# Patient Record
Sex: FEMALE | Race: WHITE | NOT HISPANIC OR LATINO | Employment: FULL TIME | URBAN - METROPOLITAN AREA
[De-identification: names, ages, dates, MRNs, and addresses within clinical notes are randomized per-mention and may not be internally consistent; named-entity substitution may affect disease eponyms.]

---

## 2023-09-20 PROBLEM — G47.33 OBSTRUCTIVE SLEEP APNEA SYNDROME: Status: ACTIVE | Noted: 2023-09-20

## 2023-09-20 PROBLEM — E66.9 OBESITY WITH BODY MASS INDEX 30 OR GREATER: Status: ACTIVE | Noted: 2023-09-20

## 2023-09-20 PROBLEM — K44.9 DIAPHRAGMATIC HERNIA WITHOUT OBSTRUCTION OR GANGRENE: Status: ACTIVE | Noted: 2023-09-20

## 2023-09-20 PROBLEM — N18.30 STAGE 3 CHRONIC KIDNEY DISEASE (MULTI): Status: ACTIVE | Noted: 2023-09-20

## 2023-09-20 PROBLEM — K21.9 GASTROESOPHAGEAL REFLUX DISEASE WITH HIATAL HERNIA: Status: ACTIVE | Noted: 2023-09-20

## 2023-09-20 PROBLEM — K30 DELAYED GASTRIC EMPTYING: Status: ACTIVE | Noted: 2023-09-20

## 2023-09-20 PROBLEM — I10 BENIGN ESSENTIAL HYPERTENSION: Status: ACTIVE | Noted: 2023-09-20

## 2023-09-20 PROBLEM — K44.9 GASTROESOPHAGEAL REFLUX DISEASE WITH HIATAL HERNIA: Status: ACTIVE | Noted: 2023-09-20

## 2023-09-20 RX ORDER — FAMOTIDINE 40 MG/1
TABLET, FILM COATED ORAL
COMMUNITY
End: 2024-04-11 | Stop reason: WASHOUT

## 2023-09-20 RX ORDER — METOPROLOL TARTRATE 50 MG/1
TABLET ORAL
COMMUNITY

## 2023-09-20 RX ORDER — ALLOPURINOL 100 MG/1
TABLET ORAL
COMMUNITY

## 2023-09-20 RX ORDER — BUPROPION HYDROCHLORIDE 300 MG/1
TABLET ORAL
COMMUNITY

## 2023-09-20 RX ORDER — TROSPIUM CHLORIDE 20 MG/1
TABLET, FILM COATED ORAL
COMMUNITY
End: 2024-04-11 | Stop reason: WASHOUT

## 2023-09-20 RX ORDER — BUTYROSPERMUM PARKII(SHEA BUTTER), SIMMONDSIA CHINENSIS (JOJOBA) SEED OIL, ALOE BARBADENSIS LEAF EXTRACT .01; 1; 3.5 G/100G; G/100G; G/100G
LIQUID TOPICAL
COMMUNITY
End: 2024-04-11 | Stop reason: WASHOUT

## 2023-09-20 RX ORDER — POTASSIUM CHLORIDE 1.5 G/1.58G
POWDER, FOR SOLUTION ORAL
COMMUNITY

## 2023-09-20 RX ORDER — OMEPRAZOLE 40 MG/1
CAPSULE, DELAYED RELEASE ORAL
COMMUNITY
End: 2024-04-11 | Stop reason: WASHOUT

## 2023-09-20 RX ORDER — HYDROCHLOROTHIAZIDE 25 MG/1
TABLET ORAL
COMMUNITY
End: 2024-04-11 | Stop reason: WASHOUT

## 2024-01-02 ENCOUNTER — APPOINTMENT (OUTPATIENT)
Dept: SURGERY | Facility: CLINIC | Age: 64
End: 2024-01-02
Payer: COMMERCIAL

## 2024-01-04 ENCOUNTER — APPOINTMENT (OUTPATIENT)
Dept: SURGERY | Facility: CLINIC | Age: 64
End: 2024-01-04
Payer: COMMERCIAL

## 2024-03-11 NOTE — PROGRESS NOTES
Patient ID: Morenita Lozada is a 63 y.o. female.  Primary Care Provider: Kevin Gorman DO    Subjective    Ref Provider: ABBY Mcbride MD     HPI: 61yo  with history of recurrent HPV presenting in GYN Oncology consultation for evaluation re: surgical resection. Patient reports history of HPV-associated cervical dysplasia for past 5-6 years with prior LEEP excision x2. Most recent evaluation with ASCUS Pap +HRHPV s/p LEEP 1 year ago, scant sampling on ECC. Patient has no acute complaints today. Denies abdominopelvic pain, nausea/vomiting, fevers, chills, chest pain or shortness of breath. She denies vaginal bleeding or abnormal vaginal discharge. Denies changes to appetite, bowel or bladder habits. Denies recent weight changes.     Treatment History:  3/2022 - LEEP: Transformation zone mucosa with chronic cervicitis and squamous epithelial denudation, see comment. Atrophy. Nabothian cyst -- Comment: no evidence of dysplasia or malignancy in tissue submitted. Clinical correlation necessary.   2022 - Pap: ASCUS +HPV 16, other HRHPV  2023 - ECC: scant fragments of squamous epithelium with focal mild squamous atypia; HPV effect cannot be excluded. Comment: Immunostain for p16 is essentially negative and the immunostain for Ki-67 demonstrate focal increased immunoreactive cells. Focal HPV cytopathic effect cannot be excluded.     Recent Imagin23 - CT A/P  Impression:  No acute findings in abdomen or pelvis. 1.5cm sclerotic lesion left ilium is nonspecific. Given its shape I would suggest follow-up nonemergent bone scan be performed. Right renal cyst. Moderate-sized hiatal hernia.   ============================================  Medical History:  - HTN  - Chronic renal insufficiency   - HLD  - GERD  - Recurrent UTIs    Surgical History:  (pending hiatal hernia repair)   - Supracervical hysterectomy ()  - Cholecystectomy  - Urethral sling    Obstetric/Gynecologic History: , FTSVD x3  -  Menarche age 13  - History of OCP or HRT use: denies  - Last Pap:  Dec 2022 (ASCUS, p16+); prior LEEP x2 with abnormal Pap x5 years    Family History:   - Denies family history of breast/colon/ovarian/uterine cancers  - Aunt  2/2 pancreatic cancer    Social History: ; works in [employer]  - Tobacco: denies  - Alcohol: 5-6 days per week, 1-2 drinks at a time  - Illicit substances, narcotics: denies    Health Maintenance:   - Last mammogram:        Objective    Visit Vitals  /86 (BP Location: Left arm, Patient Position: Sitting, BP Cuff Size: Adult)   Pulse 76   Resp 16        Interval history:  Patient is a 62 yo with history ASCUS Pap, +HRHPV with scant atypia on ECC sent to us for further management.  Last pap 2023 was LGSIL, HPV other +.  ECC=CIN1.  Here for 6 month follow up.   Patient is due for mammogram. She is not currently sexually active. Reports that she has a hx of monthly UTIs, but since starting OTC [place], has not had another.She denies any vaginal bleeding or discharge that appears to be mixed with blood, dysuria, vaginal tissue dryness or irritation, swelling or pain in LE, unexpected changes in weight, fatigue, diarrhea, bloating, pressure, or pelvic pain.   Physical Exam:    Constitutional: Doing well. SILAS  Eyes: PERRL  ENMT: Moist mucus membranes  Head/Neck: Supple. Symmetrical  Cardiovascular: Regular, rate and rhythm. 2+ equal pulses of the extremities  Respiratory/Thorax: CTA. RRR. Chest rise symmetrical.  Gastrointestinal: Non-distended, soft, non-tender  Genitourinary: External genitalia normal. No vulvar lesions visualized.  Cervical os visualized with no lesions, stenotic os, cervical borders obliterated, LEEP changes noted, atrophic changes to tissue,  no CMT, small mobile uterus, no adnexal masses noted. Smooth vaginal walls.  Vulva with no lesions noted. Smooth rectovaginal septum.   Musculoskeletal: ROM intact, no joint swelling, normal  strength  Extremities: No edema  Neurological: Alert and oriented x 3. Pleasant and cooperative.  Lymphatic: No lymphadenopathy. No lymphedema  Psychological: Appropriate mood and behavior  Skin: Warm and dry, no lesions, no rashes    A complete review of systems was performed and all systems were normal except what is noted in the interval history.          Assessment/Plan Patient is a 64 yo with history ASCUS Pap, +HRHPV with scant atypia on ECC for further management.  Last pap 9/2023 was LGSIL, HPV other +.  ECC=CIN1.  No new signs of disease since then. Vaginal atrophy.      PLAN:    Mammogram order given  Pap performed in office, F/U results  If pap stable, F/U in 6 months and repeat pap in 1 year.   RX for Estrace sent, intravaginally three nights a week, using dime to nickel size amount on finger and applying at specified area.  Do not use applicator.          SASKIA Mason     I was present with the PA student who participated in the documentation of this note.  I have personally seen and re-examined the patient and performed the medical decision-making components (assessment and plan of care). I have reviewed the PA student documentation and verified the findings in the note as written with additions or exceptions as stated in the body of this note.

## 2024-03-14 ENCOUNTER — OFFICE VISIT (OUTPATIENT)
Dept: GYNECOLOGIC ONCOLOGY | Facility: CLINIC | Age: 64
End: 2024-03-14
Payer: COMMERCIAL

## 2024-03-14 VITALS
RESPIRATION RATE: 16 BRPM | DIASTOLIC BLOOD PRESSURE: 86 MMHG | BODY MASS INDEX: 29.7 KG/M2 | SYSTOLIC BLOOD PRESSURE: 134 MMHG | WEIGHT: 173.94 LBS | HEIGHT: 64 IN | HEART RATE: 76 BPM

## 2024-03-14 DIAGNOSIS — Z12.31 SCREENING MAMMOGRAM, ENCOUNTER FOR: Primary | ICD-10-CM

## 2024-03-14 DIAGNOSIS — N87.9 CERVICAL DYSPLASIA: ICD-10-CM

## 2024-03-14 DIAGNOSIS — N95.2 VAGINAL ATROPHY: ICD-10-CM

## 2024-03-14 PROCEDURE — 88141 CYTOPATH C/V INTERPRET: CPT | Performed by: PATHOLOGY

## 2024-03-14 PROCEDURE — 99214 OFFICE O/P EST MOD 30 MIN: CPT | Performed by: NURSE PRACTITIONER

## 2024-03-14 PROCEDURE — 3075F SYST BP GE 130 - 139MM HG: CPT | Performed by: NURSE PRACTITIONER

## 2024-03-14 PROCEDURE — 88175 CYTOPATH C/V AUTO FLUID REDO: CPT

## 2024-03-14 PROCEDURE — 3079F DIAST BP 80-89 MM HG: CPT | Performed by: NURSE PRACTITIONER

## 2024-03-14 PROCEDURE — 87624 HPV HI-RISK TYP POOLED RSLT: CPT

## 2024-03-14 PROCEDURE — 1036F TOBACCO NON-USER: CPT | Performed by: NURSE PRACTITIONER

## 2024-03-14 RX ORDER — ESTRADIOL 0.1 MG/G
CREAM VAGINAL
Qty: 42.5 G | Refills: 3 | Status: SHIPPED | OUTPATIENT
Start: 2024-03-14

## 2024-03-14 ASSESSMENT — PAIN SCALES - GENERAL: PAINLEVEL: 0-NO PAIN

## 2024-03-14 NOTE — PROGRESS NOTES
Patient ID: Morenita Lozada is a 63 y.o. female.  Primary Care Provider: Kevin Gorman DO    Subjective    Ref Provider: ABBY Mcbride MD     HPI: 63yo  with history of recurrent HPV presenting in GYN Oncology consultation for evaluation re: surgical resection. Patient reports history of HPV-associated cervical dysplasia for past 5-6 years with prior LEEP excision x2. Most recent evaluation with ASCUS Pap +HRHPV s/p LEEP 1 year ago, scant sampling on ECC. Patient has no acute complaints today. Denies abdominopelvic pain, nausea/vomiting, fevers, chills, chest pain or shortness of breath. She denies vaginal bleeding or abnormal vaginal discharge. Denies changes to appetite, bowel or bladder habits. Denies recent weight changes.     Treatment History:  3/2022 - LEEP: Transformation zone mucosa with chronic cervicitis and squamous epithelial denudation, see comment. Atrophy. Nabothian cyst -- Comment: no evidence of dysplasia or malignancy in tissue submitted. Clinical correlation necessary.   2022 - Pap: ASCUS +HPV 16, other HRHPV  2023 - ECC: scant fragments of squamous epithelium with focal mild squamous atypia; HPV effect cannot be excluded. Comment: Immunostain for p16 is essentially negative and the immunostain for Ki-67 demonstrate focal increased immunoreactive cells. Focal HPV cytopathic effect cannot be excluded.     Recent Imagin23 - CT A/P  Impression:  No acute findings in abdomen or pelvis. 1.5cm sclerotic lesion left ilium is nonspecific. Given its shape I would suggest follow-up nonemergent bone scan be performed. Right renal cyst. Moderate-sized hiatal hernia.   ============================================  Medical History:  - HTN  - Chronic renal insufficiency   - HLD  - GERD  - Recurrent UTIs    Surgical History:  (pending hiatal hernia repair)   - Supracervical hysterectomy ()  - Cholecystectomy  - Urethral sling    Obstetric/Gynecologic History: , FTSVD x3  -  Menarche age 13  - History of OCP or HRT use: denies  - Last Pap:  Dec 2022 (ASCUS, p16+); prior LEEP x2 with abnormal Pap x5 years    Family History:   - Denies family history of breast/colon/ovarian/uterine cancers  - Aunt  2/2 pancreatic cancer    Social History: ; works in [employer]  - Tobacco: denies  - Alcohol: 5-6 days per week, 1-2 drinks at a time  - Illicit substances, narcotics: denies    Health Maintenance:   - Last mammogram:        Objective    Visit Vitals  /86 (BP Location: Left arm, Patient Position: Sitting, BP Cuff Size: Adult)   Pulse 76   Resp 16        Interval history:  Patient is a 64 yo with history ASCUS Pap, +HRHPV with scant atypia on ECC for further management.  Last pap 2023 was LGSIL, HPV other +.  ECC=CIN1.  Here for 6 month follow up.  Patient is due for mammogram. She is not currently sexually active. Reports that she has a hx of monthly UTIs, but since starting OTC [place], has not had another. We discussed the importance of continuing her surveillance every 6 months, for as long as her pathology report shows changes in grade or reveals +HPV16/18. She denies any vaginal bleeding or discharge that appears to be mixed with blood, dysuria, vaginal tissue dryness or irritation, swelling or pain in LE, unexpected changes in weight, fatigue, diarrhea, bloating, pressure, or pelvic pain.       Physical Exam:    Constitutional: Doing well. SILAS  Eyes: PERRL  ENMT: Moist mucus membranes  Head/Neck: Supple. Symmetrical  Cardiovascular: Regular, rate and rhythm. 2+ equal pulses of the extremities  Respiratory/Thorax: CTA. RRR. Chest rise symmetrical.  Gastrointestinal: Non-distended, soft, non-tender  Genitourinary:   External genitalia normal. No vulvar lesions visualized.  Speculum exam: Smooth vaginal walls with atrophy and without lesions or masses. Cervix is shortened, scarred, and stenotic.   Bimanual exam: Smooth vaginal wall without lesions or  masses. Normal palpation of uterus, cervix, and adnexa.    Rectovaginal exam: smooth rectovaginal septum without lesions or masses  Musculoskeletal: ROM intact, no joint swelling, normal strength  Extremities: No edema  Neurological: Alert and oriented x 3. Pleasant and cooperative.  Lymphatic: No lymphadenopathy. No lymphedema  Psychological: Appropriate mood and behavior  Skin: Warm and dry, no lesions, no rashes    A complete review of systems was performed and all systems were normal except what is noted in the interval history.          Assessment/Plan   Patient is a 64 yo with history ASCUS Pap, +HRHPV with scant atypia on ECC for further management.  Last pap 9/2023 was LGSIL, HPV other +.  ECC=CIN1. No new signs of disease since then.     PLAN:    Order for mammogram given today.  Pap performed in office.  Will follow up in 6 months if pap results show change in grade or +HPV 16/18      SASKIA Mason    I was present with the PA student who participated in the documentation of this note.  I have personally seen and re-examined the patient and performed the medical decision-making components (assessment and plan of care). I have reviewed the PA student documentation and verified the findings in the note as written with additions or exceptions as stated in the body of this note.

## 2024-03-19 ENCOUNTER — HOSPITAL ENCOUNTER (OUTPATIENT)
Dept: RADIOLOGY | Facility: HOSPITAL | Age: 64
Discharge: HOME | End: 2024-03-19
Payer: COMMERCIAL

## 2024-03-19 VITALS — BODY MASS INDEX: 28.17 KG/M2 | HEIGHT: 64 IN | WEIGHT: 165 LBS

## 2024-03-19 DIAGNOSIS — Z12.31 SCREENING MAMMOGRAM, ENCOUNTER FOR: ICD-10-CM

## 2024-03-19 PROCEDURE — 77063 BREAST TOMOSYNTHESIS BI: CPT | Performed by: RADIOLOGY

## 2024-03-19 PROCEDURE — 77067 SCR MAMMO BI INCL CAD: CPT | Performed by: RADIOLOGY

## 2024-03-19 PROCEDURE — 77067 SCR MAMMO BI INCL CAD: CPT

## 2024-03-22 ENCOUNTER — TELEPHONE (OUTPATIENT)
Dept: GYNECOLOGIC ONCOLOGY | Facility: HOSPITAL | Age: 64
End: 2024-03-22
Payer: COMMERCIAL

## 2024-03-22 LAB
CYTOLOGY CMNT CVX/VAG CYTO-IMP: NORMAL
HPV HR 12 DNA GENITAL QL NAA+PROBE: POSITIVE
HPV HR GENOTYPES PNL CVX NAA+PROBE: POSITIVE
HPV16 DNA SPEC QL NAA+PROBE: POSITIVE
HPV18 DNA SPEC QL NAA+PROBE: NEGATIVE
LAB AP HPV GENOTYPE QUESTION: YES
LAB AP HPV HR: NORMAL
LAB AP PREVIOUS ABNORMAL HISTORY: NORMAL
LABORATORY COMMENT REPORT: NORMAL
PATH REPORT.TOTAL CANCER: NORMAL

## 2024-03-22 NOTE — TELEPHONE ENCOUNTER
Phoned patient to notify her that pap results showed LGSIL/+ HPV and Epifanio Landeros CNP recommends return office visit for colposcopy.   Message sent to Gabrielle Rivera requesting she scheduled patient for colposcopy appointment.

## 2024-04-10 NOTE — PROGRESS NOTES
Patient ID: Morenita Lozada is a 63 y.o. female.  Primary Care Provider: Kevin Gorman DO    Subjective    Ref Provider: ABBY Mcbride MD     HPI: 63yo  with history of recurrent HPV presenting in GYN Oncology consultation for evaluation re: surgical resection. Patient reports history of HPV-associated cervical dysplasia for past 5-6 years with prior LEEP excision x2. Most recent evaluation with ASCUS Pap +HRHPV s/p LEEP 1 year ago, scant sampling on ECC. Patient has no acute complaints today. Denies abdominopelvic pain, nausea/vomiting, fevers, chills, chest pain or shortness of breath. She denies vaginal bleeding or abnormal vaginal discharge. Denies changes to appetite, bowel or bladder habits. Denies recent weight changes.     Treatment History:  3/2022 - LEEP: Transformation zone mucosa with chronic cervicitis and squamous epithelial denudation, see comment. Atrophy. Nabothian cyst -- Comment: no evidence of dysplasia or malignancy in tissue submitted. Clinical correlation necessary.   2022 - Pap: ASCUS +HPV 16, other HRHPV  2023 - ECC: scant fragments of squamous epithelium with focal mild squamous atypia; HPV effect cannot be excluded. Comment: Immunostain for p16 is essentially negative and the immunostain for Ki-67 demonstrate focal increased immunoreactive cells. Focal HPV cytopathic effect cannot be excluded.     Recent Imagin23 - CT A/P  Impression:  No acute findings in abdomen or pelvis. 1.5cm sclerotic lesion left ilium is nonspecific. Given its shape I would suggest follow-up nonemergent bone scan be performed. Right renal cyst. Moderate-sized hiatal hernia.   ============================================  Medical History:  - HTN  - Chronic renal insufficiency   - HLD  - GERD  - Recurrent UTIs    Surgical History:  (pending hiatal hernia repair)   - Supracervical hysterectomy ()  - Cholecystectomy  - Urethral sling    Obstetric/Gynecologic History: , FTSVD x3  -  Menarche age 13  - History of OCP or HRT use: denies  - Last Pap:  Dec 2022 (ASCUS, p16+); prior LEEP x2 with abnormal Pap x5 years    Family History:   - Denies family history of breast/colon/ovarian/uterine cancers  - Aunt  2/2 pancreatic cancer    Social History: ; works in [place]  - Tobacco: denies  - Alcohol: 5-6 days per week, 1-2 drinks at a time  - Illicit substances, narcotics: denies    Health Maintenance:   - Last mammogram:        Objective    Visit Vitals  /75 (BP Location: Right arm, Patient Position: Sitting, BP Cuff Size: Adult long)   Pulse 64   Resp 18          Interval history:  Patient is a 62 yo with history ASCUS Pap, +HRHPV with scant atypia on ECC sent to us for further management.  Last pap 3/2024 was LGSIL, HPV 16 and other+.  Here for colposcopy.     Patient states she started using Estrace cream since last visit.  She denies any new complaints since last seen.     Physical Exam:    Constitutional: Doing well. SILAS  Eyes: PERRL  ENMT: Moist mucus membranes  Head/Neck: Supple. Symmetrical  Genitourinary: Colposcopy: Adequate colposcopy performed after application of dilute acetic acid solution to the cervix.  There were some acetowhite changes and punctate vessels noted at 9 o'clock.  1 cervical biopsy taken at 9 o'clock and an ECC was also obtained.  Monsel's and pressure were used for hemostasis.  The patient tolerated the procedure well.  Musculoskeletal: ROM intact, no joint swelling, normal strength  Extremities: No edema  Neurological: Alert and oriented x 3. Pleasant and cooperative.  Psychological: Appropriate mood and behavior  Skin: Warm and dry, no lesions, no rashes    A complete review of systems was performed and all systems were normal except what is noted in the interval history.        Assessment/Plan Patient is a 62 yo with history ASCUS Pap, +HRHPV with scant atypia on ECC s/p LEEP.   LGSIL HPV+ pap.  +acetowhite changes.       PLAN:  ECC done today, F/U results  Cervical biopsy, F/U results  If DIANA 1, repeat pap in 1 year and exam in 6 months  If DIANA 2-3 refer for excision procedure

## 2024-04-11 ENCOUNTER — OFFICE VISIT (OUTPATIENT)
Dept: GYNECOLOGIC ONCOLOGY | Facility: CLINIC | Age: 64
End: 2024-04-11
Payer: COMMERCIAL

## 2024-04-11 VITALS
SYSTOLIC BLOOD PRESSURE: 138 MMHG | BODY MASS INDEX: 28.82 KG/M2 | OXYGEN SATURATION: 98 % | HEART RATE: 64 BPM | HEIGHT: 64 IN | RESPIRATION RATE: 18 BRPM | DIASTOLIC BLOOD PRESSURE: 75 MMHG | WEIGHT: 168.8 LBS

## 2024-04-11 DIAGNOSIS — R87.612 LOW GRADE SQUAMOUS INTRAEPITHELIAL LESION ON CYTOLOGIC SMEAR OF CERVIX (LGSIL): ICD-10-CM

## 2024-04-11 DIAGNOSIS — N95.2 VAGINAL ATROPHY: ICD-10-CM

## 2024-04-11 DIAGNOSIS — N87.9 CERVICAL DYSPLASIA: Primary | ICD-10-CM

## 2024-04-11 PROCEDURE — 3075F SYST BP GE 130 - 139MM HG: CPT | Performed by: NURSE PRACTITIONER

## 2024-04-11 PROCEDURE — 3078F DIAST BP <80 MM HG: CPT | Performed by: NURSE PRACTITIONER

## 2024-04-11 PROCEDURE — 88305 TISSUE EXAM BY PATHOLOGIST: CPT

## 2024-04-11 PROCEDURE — 88305 TISSUE EXAM BY PATHOLOGIST: CPT | Performed by: PATHOLOGY

## 2024-04-11 PROCEDURE — 57454 BX/CURETT OF CERVIX W/SCOPE: CPT | Performed by: NURSE PRACTITIONER

## 2024-04-11 PROCEDURE — 88342 IMHCHEM/IMCYTCHM 1ST ANTB: CPT

## 2024-04-11 PROCEDURE — 88342 IMHCHEM/IMCYTCHM 1ST ANTB: CPT | Performed by: PATHOLOGY

## 2024-04-11 ASSESSMENT — PAIN SCALES - GENERAL: PAINLEVEL: 0-NO PAIN

## 2024-04-17 ENCOUNTER — TELEPHONE (OUTPATIENT)
Dept: GYNECOLOGIC ONCOLOGY | Facility: HOSPITAL | Age: 64
End: 2024-04-17
Payer: COMMERCIAL

## 2024-04-17 LAB
LAB AP ASR DISCLAIMER: NORMAL
LABORATORY COMMENT REPORT: NORMAL
PATH REPORT.COMMENTS IMP SPEC: NORMAL
PATH REPORT.FINAL DX SPEC: NORMAL
PATH REPORT.GROSS SPEC: NORMAL
PATH REPORT.RELEVANT HX SPEC: NORMAL
PATH REPORT.TOTAL CANCER: NORMAL

## 2024-04-17 NOTE — TELEPHONE ENCOUNTER
Phoned patient to notify that ECC and biopsy results showed CIN1 and that Akanksha Landeros CNP recommends repeat pap in 1 year and follow up as scheduled in September.   Message left to notify regarding stable results and follow up recommendations.

## 2024-05-16 ENCOUNTER — TELEPHONE (OUTPATIENT)
Dept: SURGERY | Facility: CLINIC | Age: 64
End: 2024-05-16
Payer: COMMERCIAL

## 2024-06-05 NOTE — PROGRESS NOTES
Subjective   Patient ID: Morenita Lozada is a 63 y.o. female who presents for No chief complaint on file..  HPI  ABDOMINAL PAIN/ POSSIBLE UMBILICAL HERNIA    Review of Systems  CONSTITUTIONAL:          Chills No.  Fatigue No.  Fever No.         CARDIOLOGY:          Negative for dizziness, chest pain, palpitations, shortness of breath.         RESPIRATORY:          Sleep Apnea No.  Negative for chest congestion, cough, wheezing.         GASTROENTEROLOGY:          Food Intolerance No.  Abdominal pain  yes.  Acid reflux No.  Black stools No.  Constipation No.  Diarrhea No.  Loss of appetite no.  Nausea No.  Vomiting No.         UROLOGY:          Negative for dysuria, urinary urgency, kidney stones.         PSYCHOLOGY:          Negative for depression, anxiety, high stress level.

## 2024-06-06 ENCOUNTER — OFFICE VISIT (OUTPATIENT)
Dept: SURGERY | Facility: CLINIC | Age: 64
End: 2024-06-06
Payer: COMMERCIAL

## 2024-06-06 VITALS
HEIGHT: 64 IN | HEART RATE: 80 BPM | DIASTOLIC BLOOD PRESSURE: 88 MMHG | SYSTOLIC BLOOD PRESSURE: 140 MMHG | WEIGHT: 168 LBS | BODY MASS INDEX: 28.68 KG/M2

## 2024-06-06 DIAGNOSIS — K43.2 INCISIONAL HERNIA, WITHOUT OBSTRUCTION OR GANGRENE: Primary | ICD-10-CM

## 2024-06-06 PROCEDURE — 99214 OFFICE O/P EST MOD 30 MIN: CPT | Performed by: SURGERY

## 2024-06-06 PROCEDURE — 3077F SYST BP >= 140 MM HG: CPT | Performed by: SURGERY

## 2024-06-06 PROCEDURE — 3079F DIAST BP 80-89 MM HG: CPT | Performed by: SURGERY

## 2024-06-06 RX ORDER — POLYETHYLENE GLYCOL 3350 17 G/17G
17 POWDER, FOR SOLUTION ORAL DAILY
COMMUNITY

## 2024-06-06 RX ORDER — LOSARTAN POTASSIUM 25 MG/1
25 TABLET ORAL DAILY
COMMUNITY

## 2024-06-06 ASSESSMENT — COLUMBIA-SUICIDE SEVERITY RATING SCALE - C-SSRS
1. IN THE PAST MONTH, HAVE YOU WISHED YOU WERE DEAD OR WISHED YOU COULD GO TO SLEEP AND NOT WAKE UP?: NO
2. HAVE YOU ACTUALLY HAD ANY THOUGHTS OF KILLING YOURSELF?: NO
6. HAVE YOU EVER DONE ANYTHING, STARTED TO DO ANYTHING, OR PREPARED TO DO ANYTHING TO END YOUR LIFE?: NO

## 2024-06-06 ASSESSMENT — LIFESTYLE VARIABLES
AUDIT-C TOTAL SCORE: 4
HOW OFTEN DO YOU HAVE SIX OR MORE DRINKS ON ONE OCCASION: NEVER
HOW MANY STANDARD DRINKS CONTAINING ALCOHOL DO YOU HAVE ON A TYPICAL DAY: 3 OR 4
HOW OFTEN DO YOU HAVE A DRINK CONTAINING ALCOHOL: 2-3 TIMES A WEEK
SKIP TO QUESTIONS 9-10: 0

## 2024-06-06 ASSESSMENT — PATIENT HEALTH QUESTIONNAIRE - PHQ9
1. LITTLE INTEREST OR PLEASURE IN DOING THINGS: NOT AT ALL
2. FEELING DOWN, DEPRESSED OR HOPELESS: NOT AT ALL
SUM OF ALL RESPONSES TO PHQ9 QUESTIONS 1 AND 2: 0

## 2024-06-06 ASSESSMENT — PAIN SCALES - GENERAL: PAINLEVEL: 0-NO PAIN

## 2024-06-06 NOTE — H&P
History Of Present Illness  Morenita Lozada is a 63 y.o. female presenting with an abdominal bulge and pain at site of known incisional hernia.  She had initial consultation with me in February of 2023 for a hiatal hernia.  At time of consultation, I could feel a bulge at site of 12mm trocar site subxiphoid from a prior laparoscopic cholecystectomy and CT showed an incisional hernia at this site.  At laparoscopic hiatal hernia repair last summer, my operative note makes mention of not seeing a dimple in the peritoneum at site of bulge so the falciform was not taken down and this hernia was not repaired.  Morenita tells me she was constipated for several days recently and had painful bulge at this location prompting her to call the office.  She was a little confused by the concept of incisional hernia and hiatal hernia and was worried her hiatal hernia had recurred.  She denies reflux, GERD or dysphagia.     Past Medical History  Past Medical History:   Diagnosis Date    Abdominal hernia     Acid reflux     Anxiety     Depression     HPV (human papilloma virus) anogenital infection     HTN (hypertension)        Surgical History  Past Surgical History:   Procedure Laterality Date    HYSTERECTOMY      age 31    LAPAROSCOPY REPAIR HIATAL HERNIA  06/21/2023    laparoscopic hiatal hernia repair with mesh reinforcement of crural repair, toupet partial fundoplication ([facility])        Social History  She reports that she has never smoked. She has never used smokeless tobacco. She reports current alcohol use. She reports that she does not use drugs.    Family History  Family History   Problem Relation Name Age of Onset    Hypertension Mother      Lung cancer Father      Stroke Sister      Hypertension Sister      Prostate cancer Brother          Allergies  Patient has no known allergies.    Review of Systems   CONSTITUTIONAL:          Chills No.  Fatigue No.  Fever No.         CARDIOLOGY:          Negative for dizziness, chest  "pain, palpitations, shortness of breath.         RESPIRATORY:          Sleep Apnea No.  Negative for chest congestion, cough, wheezing.         GASTROENTEROLOGY:          Food Intolerance No.  Abdominal pain  yes.  Acid reflux No.  Black stools No.  Constipation No.  Diarrhea No.  Loss of appetite no.  Nausea No.  Vomiting No.         UROLOGY:          Negative for dysuria, urinary urgency, kidney stones.         PSYCHOLOGY:          Negative for depression, anxiety, high stress level.    Physical Exam       General Examination:         GENERAL APPEARANCE: alert and oriented x 3, Pleasant and cooperative, No Acute Distress.          HEENT: PERRLA.          HEART: regular rate and rhythm.          LUNGS: clear to auscultation bilaterally.          CHEST: normal shape and expansion.          ABDOMEN: painful bulge at site of 12mm laparoscopic cholecystectomy trocar site corresponding to her recent pain and the notes on my office visit from last February, no guarding, no CVA tenderness.          EXTREMITIES: no edema, no ulcerations.          SKIN: normal, no rash.          NEUROLOGIC EXAM: CN's II-XII grossly intact, gait normal.          BACK: no CVA tenderness.       Last Recorded Vitals  Blood pressure 140/88, pulse 80, height 1.626 m (5' 4\"), weight 76.2 kg (168 lb).         Assessment/Plan   Problem List Items Addressed This Visit             ICD-10-CM    Incisional hernia, without obstruction or gangrene - Primary K43.2       I explained the differences between incisional hernia and hiatal hernia.  I explained the operative findings from last year.  I explained the reason she was uncomfortable while constipated.  I offered her laparoscopic incisional hernia repair with mesh.  She will start taking miralax regularly and call if she has any symptoms or questions.     I reviewed my operative notes, CT scans and office notes from last year in [facility] for a total of 15 minutes.  21 minutes face to face time in addition.  " Documenatation 10 minutes.  Total time 46 minutes.    Tavares Knox MD

## 2024-07-10 ENCOUNTER — TELEPHONE (OUTPATIENT)
Dept: SURGERY | Facility: CLINIC | Age: 64
End: 2024-07-10
Payer: COMMERCIAL

## 2024-07-10 NOTE — TELEPHONE ENCOUNTER
I called patient to find out if she was ready to schedule incisional hernia repair. She does not want to proceed with surgery at this point unless the hernia worsens. I asked her to call us back if anything changed.

## 2024-09-11 NOTE — PROGRESS NOTES
Patient ID: Morenita Lozada is a 64 y.o. female.  Primary Care Provider: Kevin Gorman DO    Subjective    Ref Provider: ABBY Mcbride MD     HPI: [id number]  with history of recurrent HPV presenting in GYN Oncology consultation     Treatment History:  3/2022 - LEEP: Transformation zone mucosa with chronic cervicitis and squamous epithelial denudation, see comment. Atrophy. Nabothian cyst -- Comment: no evidence of dysplasia or malignancy in tissue submitted. Clinical correlation necessary.   2022 - Pap: ASCUS +HPV 16, other HRHPV  2023 - ECC: scant fragments of squamous epithelium with focal mild squamous atypia; HPV effect cannot be excluded. Comment: Immunostain for p16 is essentially negative and the immunostain for Ki-67 demonstrate focal increased immunoreactive cells. Focal HPV cytopathic effect cannot be excluded.     Recent Imagin23 - CT A/P  Impression:  No acute findings in abdomen or pelvis. 1.5cm sclerotic lesion left ilium is nonspecific. Given its shape I would suggest follow-up nonemergent bone scan be performed. Right renal cyst. Moderate-sized hiatal hernia.   ============================================  Medical History:  - HTN  - Chronic renal insufficiency   - HLD  - GERD  - Recurrent UTIs    Surgical History:  (pending hiatal hernia repair)   - Supracervical hysterectomy ()  - Cholecystectomy  - Urethral sling    Obstetric/Gynecologic History: , FTSVD x3  - Menarche age 13  - History of OCP or HRT use: denies  - Last Pap:  Dec 2022 (ASCUS, p16+); prior LEEP x2 with abnormal Pap x5 years    Family History:   - Denies family history of breast/colon/ovarian/uterine cancers  - Aunt  2/2 pancreatic cancer    Social History: ; works in [employer]  - Tobacco: denies  - Alcohol: 5-6 days per week, 1-2 drinks at a time  - Illicit substances, narcotics: denies    Health Maintenance:   - Last mammogram:        Objective    Visit Vitals  BP  126/87 (BP Location: Right arm, Patient Position: Sitting, BP Cuff Size: Adult)   Pulse 72   Resp 18         Interval history:  Patient is a 65 yo with history ASCUS Pap, +HRHPV with scant atypia on ECC sent to us for further management.  Last pap 3/2024 was LGSIL, HPV 16 and other+. 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  Here for 6 month exam. Patient retired at the beginning of the month.  She is traveling between grandchildren to visit.  She has not been sexually active.  Patient denies any vaginal bleeding, pink tinged discharge. Patient has constipation, takes Miralax as needed.  Appetite has been good.  Energy levels are baseline.  Patient denies hematuria.  Patient denies urinary leakage or incontinence, takes Vesicare.  Has had chronic UTI's, 4 in the last 6 months.   She has been following Urology, has abx at home and takes when she gets symptoms.  Has been using Estrace 3 times a week also.  Mammogram is up to date.          Physical Exam:    Constitutional: Doing well. SILAS  Eyes: PERRL  ENMT: Moist mucus membranes  Head/Neck: Supple. Symmetrical  Cardiovascular: Regular, rate and rhythm. 2+ equal pulses of the extremities  Respiratory/Thorax: CTA. RRR. Chest rise symmetrical.  Gastrointestinal: Non-distended, soft, non-tender  Genitourinary: Cervical os visualized with no lesions, LEEP changes noted, no CMT, small mobile uterus, no adnexal masses noted. Smooth vaginal walls.  Vulva with no lesions noted.   Musculoskeletal: ROM intact, no joint swelling, normal strength  Extremities: No edema  Neurological: Alert and oriented x 3. Pleasant and cooperative.  Lymphatic: No lymphadenopathy. No lymphedema  Psychological: Appropriate mood and behavior  Skin: Warm and dry, no lesions, no rashes    A complete review of systems was performed and all systems were normal except what is noted in the interval history.       Assessment/Plan Patient is a 62 yo with history ASCUS Pap, +HRHPV with scant atypia on ECC s/p LEEP  in 2022.   CIN1 biopsy 4/2024.  Recurrent UTI's.       PLAN:  Pap in 6 months for follow up  F/U with Urology for UTI symptoms  Continue with Estrace

## 2024-09-12 ENCOUNTER — APPOINTMENT (OUTPATIENT)
Dept: GYNECOLOGIC ONCOLOGY | Facility: CLINIC | Age: 64
End: 2024-09-12
Payer: COMMERCIAL

## 2024-09-12 VITALS
SYSTOLIC BLOOD PRESSURE: 126 MMHG | BODY MASS INDEX: 29.81 KG/M2 | DIASTOLIC BLOOD PRESSURE: 87 MMHG | WEIGHT: 174.6 LBS | HEART RATE: 72 BPM | RESPIRATION RATE: 18 BRPM | OXYGEN SATURATION: 96 % | HEIGHT: 64 IN

## 2024-09-12 DIAGNOSIS — N87.9 CERVICAL DYSPLASIA: Primary | ICD-10-CM

## 2024-09-12 PROCEDURE — 99213 OFFICE O/P EST LOW 20 MIN: CPT | Performed by: NURSE PRACTITIONER

## 2024-09-12 PROCEDURE — 99459 PELVIC EXAMINATION: CPT | Performed by: NURSE PRACTITIONER

## 2024-09-12 PROCEDURE — 3079F DIAST BP 80-89 MM HG: CPT | Performed by: NURSE PRACTITIONER

## 2024-09-12 PROCEDURE — 3008F BODY MASS INDEX DOCD: CPT | Performed by: NURSE PRACTITIONER

## 2024-09-12 PROCEDURE — 1126F AMNT PAIN NOTED NONE PRSNT: CPT | Performed by: NURSE PRACTITIONER

## 2024-09-12 PROCEDURE — 3074F SYST BP LT 130 MM HG: CPT | Performed by: NURSE PRACTITIONER

## 2024-09-12 ASSESSMENT — PAIN SCALES - GENERAL: PAINLEVEL: 0-NO PAIN

## 2025-03-11 NOTE — PROGRESS NOTES
Patient ID: Morenita Lozada is a 64 y.o. female.  Primary Care Provider: Kevin Gorman, DO    Subjective    Ref Provider: ABBY Mcbride MD     HPI: [id number]  with history of recurrent HPV presenting in GYN Oncology consultation     Treatment History:  3/2022 - LEEP: Transformation zone mucosa with chronic cervicitis and squamous epithelial denudation, see comment. Atrophy. Nabothian cyst -- Comment: no evidence of dysplasia or malignancy in tissue submitted. Clinical correlation necessary.   2022 - Pap: ASCUS +HPV 16, other HRHPV  2023 - ECC: scant fragments of squamous epithelium with focal mild squamous atypia; HPV effect cannot be excluded. Comment: Immunostain for p16 is essentially negative and the immunostain for Ki-67 demonstrate focal increased immunoreactive cells. Focal HPV cytopathic effect cannot be excluded.     Recent Imagin23 - CT A/P  Impression:  No acute findings in abdomen or pelvis. 1.5cm sclerotic lesion left ilium is nonspecific. Given its shape I would suggest follow-up nonemergent bone scan be performed. Right renal cyst. Moderate-sized hiatal hernia.   ============================================  Medical History:  - HTN  - Chronic renal insufficiency   - HLD  - GERD  - Recurrent UTIs    Surgical History:  - Supracervical hysterectomy ()  - Cholecystectomy  - Urethral sling    Obstetric/Gynecologic History: , FTSVD x3  - Menarche age 13  - History of OCP or HRT use: denies  - Last Pap:  Dec 2022 (ASCUS, p16+); prior LEEP x2 with abnormal Pap x5 years    Family History:   - Denies family history of breast/colon/ovarian/uterine cancers  - Aunt  2/2 pancreatic cancer    Social History: ; works in [employer]  - Tobacco: denies  - Alcohol: 5-6 days per week, 1-2 drinks at a time  - Illicit substances, narcotics: denies    Health Maintenance:   - Last mammogram:        Objective    Visit Vitals  /89 (BP Location: Left arm,  Patient Position: Sitting, BP Cuff Size: Large adult)   Pulse 61   Resp 18           Interval history:  Patient is a 63 yo with history ASCUS Pap, +HRHPV with scant atypia on ECC sent to us for further management.  Last pap 3/2024 was LGSIL, HPV 16 and other+. 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  Here for 6 month exam and pap. Patient been using Estrace cream, had a few UTI's since last seen but less frequent than previously.  Patient denies any VB or DC. Not been sexually active.  Denies any bowel issues.        Physical Exam:    Constitutional: Doing well. SILAS  Eyes: PERRL  ENMT: Moist mucus membranes  Head/Neck: Supple. Symmetrical  Cardiovascular: Regular, rate and rhythm. 2+ equal pulses of the extremities  Respiratory/Thorax: CTA. RRR. Chest rise symmetrical.  Gastrointestinal: Non-distended, soft, non-tender  Genitourinary:  Cervical os visualized with LEEP changes noted, cervical borders obliterated, no lesions noted, smooth vaginal walls, no vulvar lesions noted- improved atrophic changes at introitus  Musculoskeletal: ROM intact, no joint swelling, normal strength  Extremities: No edema  Neurological: Alert and oriented x 3. Pleasant and cooperative.  Lymphatic: No lymphadenopathy. No lymphedema  Psychological: Appropriate mood and behavior  Skin: Warm and dry, no lesions, no rashes    A complete review of systems was performed and all systems were normal except what is noted in the interval history.       Assessment/Plan Patient is a 63 yo with history ASCUS Pap, +HRHPV with scant atypia on ECC s/p LEEP in 2022.   CIN1 biopsy 4/2024.  Improving atrophy.      PLAN:  Mammogram order   Pap today, F/U results  If ASCUS or better F/U in 6 months or as needed

## 2025-03-13 ENCOUNTER — APPOINTMENT (OUTPATIENT)
Dept: GYNECOLOGIC ONCOLOGY | Facility: CLINIC | Age: 65
End: 2025-03-13
Payer: COMMERCIAL

## 2025-03-13 VITALS
WEIGHT: 178.13 LBS | SYSTOLIC BLOOD PRESSURE: 139 MMHG | BODY MASS INDEX: 30.41 KG/M2 | DIASTOLIC BLOOD PRESSURE: 89 MMHG | HEART RATE: 61 BPM | RESPIRATION RATE: 18 BRPM | HEIGHT: 64 IN

## 2025-03-13 DIAGNOSIS — R87.612 LOW GRADE SQUAMOUS INTRAEPITHELIAL LESION ON CYTOLOGIC SMEAR OF CERVIX (LGSIL): ICD-10-CM

## 2025-03-13 DIAGNOSIS — Z12.31 SCREENING MAMMOGRAM, ENCOUNTER FOR: Primary | ICD-10-CM

## 2025-03-13 DIAGNOSIS — N87.9 CERVICAL DYSPLASIA: ICD-10-CM

## 2025-03-13 PROCEDURE — 88175 CYTOPATH C/V AUTO FLUID REDO: CPT

## 2025-03-13 PROCEDURE — 1036F TOBACCO NON-USER: CPT | Performed by: NURSE PRACTITIONER

## 2025-03-13 PROCEDURE — 87624 HPV HI-RISK TYP POOLED RSLT: CPT

## 2025-03-13 PROCEDURE — 3008F BODY MASS INDEX DOCD: CPT | Performed by: NURSE PRACTITIONER

## 2025-03-13 PROCEDURE — 88141 CYTOPATH C/V INTERPRET: CPT | Performed by: PATHOLOGY

## 2025-03-13 PROCEDURE — 1126F AMNT PAIN NOTED NONE PRSNT: CPT | Performed by: NURSE PRACTITIONER

## 2025-03-13 PROCEDURE — 3079F DIAST BP 80-89 MM HG: CPT | Performed by: NURSE PRACTITIONER

## 2025-03-13 PROCEDURE — 3075F SYST BP GE 130 - 139MM HG: CPT | Performed by: NURSE PRACTITIONER

## 2025-03-13 PROCEDURE — 99214 OFFICE O/P EST MOD 30 MIN: CPT | Performed by: NURSE PRACTITIONER

## 2025-03-13 ASSESSMENT — PAIN SCALES - GENERAL: PAINLEVEL_OUTOF10: 0-NO PAIN

## 2025-03-26 ENCOUNTER — TELEPHONE (OUTPATIENT)
Dept: GYNECOLOGIC ONCOLOGY | Facility: HOSPITAL | Age: 65
End: 2025-03-26
Payer: MEDICARE

## 2025-03-26 LAB

## 2025-03-26 NOTE — TELEPHONE ENCOUNTER
Phoned patient to notify that pap showed LGSIL/+ HPV and Dr. Falk recommends return office visit for colposcopy with Akanksha Landeros CNP.    Patient verbalized her understanding of information given.   Message sent to coordinators requesting they contact patient to schedule colposcopy appointment.

## 2025-03-27 ENCOUNTER — HOSPITAL ENCOUNTER (OUTPATIENT)
Dept: RADIOLOGY | Facility: HOSPITAL | Age: 65
Discharge: HOME | End: 2025-03-27
Payer: MEDICARE

## 2025-03-27 VITALS — WEIGHT: 171 LBS | BODY MASS INDEX: 29.19 KG/M2 | HEIGHT: 64 IN

## 2025-03-27 DIAGNOSIS — Z12.31 SCREENING MAMMOGRAM, ENCOUNTER FOR: ICD-10-CM

## 2025-03-27 PROCEDURE — 77063 BREAST TOMOSYNTHESIS BI: CPT

## 2025-03-27 PROCEDURE — 77067 SCR MAMMO BI INCL CAD: CPT | Performed by: RADIOLOGY

## 2025-03-27 PROCEDURE — 77063 BREAST TOMOSYNTHESIS BI: CPT | Performed by: RADIOLOGY

## 2025-04-30 NOTE — PROGRESS NOTES
Patient ID: Morenita Lozada is a 64 y.o. female.  Primary Care Provider: Kevin Groman, DO    Subjective    Ref Provider: ABBY Mcbride MD     HPI: [id number]  with history of recurrent HPV presenting in GYN Oncology consultation     Treatment History:  3/2022 - LEEP: Transformation zone mucosa with chronic cervicitis and squamous epithelial denudation, see comment. Atrophy. Nabothian cyst -- Comment: no evidence of dysplasia or malignancy in tissue submitted. Clinical correlation necessary.   2022 - Pap: ASCUS +HPV 16, other HRHPV  2023 - ECC: scant fragments of squamous epithelium with focal mild squamous atypia; HPV effect cannot be excluded. Comment: Immunostain for p16 is essentially negative and the immunostain for Ki-67 demonstrate focal increased immunoreactive cells. Focal HPV cytopathic effect cannot be excluded.     Recent Imagin23 - CT A/P  Impression:  No acute findings in abdomen or pelvis. 1.5cm sclerotic lesion left ilium is nonspecific. Given its shape I would suggest follow-up nonemergent bone scan be performed. Right renal cyst. Moderate-sized hiatal hernia.   ============================================  Medical History:  - HTN  - Chronic renal insufficiency   - HLD  - GERD  - Recurrent UTIs    Surgical History:  - Supracervical hysterectomy ()  - Cholecystectomy  - Urethral sling    Obstetric/Gynecologic History: , FTSVD x3  - Menarche age 13  - History of OCP or HRT use: denies  - Last Pap:  Dec 2022 (ASCUS, p16+); prior LEEP x2 with abnormal Pap x5 years    Family History:   - Denies family history of breast/colon/ovarian/uterine cancers  - Aunt  2/2 pancreatic cancer    Social History: ; works in [employer]  - Tobacco: denies  - Alcohol: 5-6 days per week, 1-2 drinks at a time  - Illicit substances, narcotics: denies    Health Maintenance:   - Last mammogram:        Objective    Visit Vitals  BP (!) 154/91 (BP Location: Right arm,  Patient Position: Sitting, BP Cuff Size: Adult long)   Pulse 63   Resp 18       Interval history:  Patient is a 65 yo with history ASCUS Pap, +HRHPV with scant atypia on ECC sent to us for further management.  Last pap 3/2025 was LGSIL, HPV other +.  Here for colposcopy.  Patient mammogram is up to date.   Spent winter with daughter helping with grandchildren.  No new medical issues.        Physical Exam:    Constitutional: Doing well.   Eyes: PERRL  ENMT: Moist mucus membranes  Head/Neck: Supple. Symmetrical  Genitourinary:  Cervical os visualized, LEEP changes, stenotic os, atrophic changes noted. Colposcopy: Adequate colposcopy performed after application of dilute acetic acid solution to the cervix.  Atrophic changes noted, smooth tissue. No biopsy taken.  The patient tolerated the procedure well.  Musculoskeletal: ROM intact, no joint swelling, normal strength  Extremities: No edema  Neurological: Alert and oriented x 3. Pleasant and cooperative.  Lymphatic: No lymphadenopathy. No lymphedema  Psychological: Appropriate mood and behavior  Skin: Warm and dry, no lesions, no rashes    A complete review of systems was performed and all systems were normal except what is noted in the interval history.       Assessment/Plan Patient is a 65 yo with history ASCUS Pap, +HRHPV with scant atypia on ECC s/p LEEP in 2022.  Stable atrophic changes, no acetowhite changes.      PLAN:  F/U in 1 year for pap or as needed  Continue with Estrace, call for refill

## 2025-05-01 ENCOUNTER — APPOINTMENT (OUTPATIENT)
Dept: GYNECOLOGIC ONCOLOGY | Facility: CLINIC | Age: 65
End: 2025-05-01
Payer: MEDICARE

## 2025-05-01 VITALS
DIASTOLIC BLOOD PRESSURE: 91 MMHG | SYSTOLIC BLOOD PRESSURE: 154 MMHG | HEIGHT: 64 IN | RESPIRATION RATE: 18 BRPM | OXYGEN SATURATION: 98 % | WEIGHT: 178.6 LBS | HEART RATE: 63 BPM | BODY MASS INDEX: 30.49 KG/M2

## 2025-05-01 DIAGNOSIS — R87.612 LOW GRADE SQUAMOUS INTRAEPITHELIAL LESION ON CYTOLOGIC SMEAR OF CERVIX (LGSIL): Primary | ICD-10-CM

## 2025-05-01 PROCEDURE — 57420 EXAM OF VAGINA W/SCOPE: CPT | Performed by: NURSE PRACTITIONER

## 2025-05-01 PROCEDURE — 3008F BODY MASS INDEX DOCD: CPT | Performed by: NURSE PRACTITIONER

## 2025-05-01 PROCEDURE — 3080F DIAST BP >= 90 MM HG: CPT | Performed by: NURSE PRACTITIONER

## 2025-05-01 PROCEDURE — 3077F SYST BP >= 140 MM HG: CPT | Performed by: NURSE PRACTITIONER

## 2025-05-01 PROCEDURE — 1126F AMNT PAIN NOTED NONE PRSNT: CPT | Performed by: NURSE PRACTITIONER

## 2025-05-01 ASSESSMENT — PAIN SCALES - GENERAL: PAINLEVEL_OUTOF10: 0-NO PAIN

## 2026-05-07 ENCOUNTER — APPOINTMENT (OUTPATIENT)
Dept: GYNECOLOGIC ONCOLOGY | Facility: CLINIC | Age: 66
End: 2026-05-07
Payer: MEDICARE